# Patient Record
Sex: MALE | URBAN - METROPOLITAN AREA
[De-identification: names, ages, dates, MRNs, and addresses within clinical notes are randomized per-mention and may not be internally consistent; named-entity substitution may affect disease eponyms.]

---

## 2017-09-25 ENCOUNTER — NEW PATIENT (OUTPATIENT)
Dept: URBAN - METROPOLITAN AREA CLINIC 44 | Facility: CLINIC | Age: 57
End: 2017-09-25

## 2017-09-25 DIAGNOSIS — H33.321: ICD-10-CM

## 2017-09-25 DIAGNOSIS — H43.811: ICD-10-CM

## 2017-09-25 PROCEDURE — 92225 OPHTHALMOSCOPY (INITIAL): CPT

## 2017-09-25 PROCEDURE — 99244 OFF/OP CNSLTJ NEW/EST MOD 40: CPT

## 2017-09-25 ASSESSMENT — TONOMETRY
OS_IOP_MMHG: 11
OD_IOP_MMHG: 8

## 2017-09-25 ASSESSMENT — VISUAL ACUITY
OS_PH: 20/20-2
OD_SC: 20/30-1
OS_SC: 20/30+2
OD_PH: 20/25-2

## 2017-09-26 ENCOUNTER — PROCEDURE ONLY (OUTPATIENT)
Dept: URBAN - METROPOLITAN AREA CLINIC 11 | Facility: CLINIC | Age: 57
End: 2017-09-26

## 2017-09-26 DIAGNOSIS — H33.321: ICD-10-CM

## 2017-09-26 PROCEDURE — 67145 PROPH RTA DTCHMNT PC: CPT

## 2017-09-26 ASSESSMENT — TONOMETRY: OD_IOP_MMHG: 17

## 2017-09-26 ASSESSMENT — VISUAL ACUITY
OD_PH: 20/20-2
OD_SC: 20/30+2

## 2017-10-09 ENCOUNTER — POST-OP CHECK (OUTPATIENT)
Dept: URBAN - METROPOLITAN AREA CLINIC 44 | Facility: CLINIC | Age: 57
End: 2017-10-09

## 2017-10-09 DIAGNOSIS — H43.811: ICD-10-CM

## 2017-10-09 DIAGNOSIS — H33.301: ICD-10-CM

## 2017-10-09 PROCEDURE — 99024 POSTOP FOLLOW-UP VISIT: CPT

## 2017-10-09 ASSESSMENT — TONOMETRY: OD_IOP_MMHG: 18

## 2017-10-09 ASSESSMENT — VISUAL ACUITY: OD_SC: 20/20-1

## 2017-11-20 ENCOUNTER — POST-OP CHECK (OUTPATIENT)
Dept: URBAN - METROPOLITAN AREA CLINIC 44 | Facility: CLINIC | Age: 57
End: 2017-11-20

## 2017-11-20 DIAGNOSIS — H43.811: ICD-10-CM

## 2017-11-20 DIAGNOSIS — H33.301: ICD-10-CM

## 2017-11-20 PROCEDURE — 99024 POSTOP FOLLOW-UP VISIT: CPT

## 2017-11-20 ASSESSMENT — TONOMETRY: OD_IOP_MMHG: 14

## 2017-11-20 ASSESSMENT — VISUAL ACUITY: OD_SC: 20/25+1

## 2018-01-13 ENCOUNTER — UNSCHEDULED FOLLOW UP (OUTPATIENT)
Dept: URBAN - METROPOLITAN AREA CLINIC 11 | Facility: CLINIC | Age: 58
End: 2018-01-13

## 2018-01-13 DIAGNOSIS — H43.813: ICD-10-CM

## 2018-01-13 DIAGNOSIS — H33.301: ICD-10-CM

## 2018-01-13 PROCEDURE — 92014 COMPRE OPH EXAM EST PT 1/>: CPT

## 2018-01-13 PROCEDURE — 92226 OPHTHALMOSCOPY (SUB): CPT

## 2018-01-13 ASSESSMENT — TONOMETRY: OS_IOP_MMHG: 15

## 2018-02-12 ENCOUNTER — UNSCHEDULED FOLLOW UP (OUTPATIENT)
Dept: URBAN - METROPOLITAN AREA CLINIC 44 | Facility: CLINIC | Age: 58
End: 2018-02-12

## 2018-02-12 DIAGNOSIS — H33.312: ICD-10-CM

## 2018-02-12 DIAGNOSIS — H43.813: ICD-10-CM

## 2018-02-12 DIAGNOSIS — H33.301: ICD-10-CM

## 2018-02-12 PROCEDURE — 67145 PROPH RTA DTCHMNT PC: CPT

## 2018-02-12 PROCEDURE — 92012 INTRM OPH EXAM EST PATIENT: CPT | Mod: 57

## 2018-02-12 PROCEDURE — 92226 OPHTHALMOSCOPY (SUB): CPT

## 2018-02-12 ASSESSMENT — TONOMETRY
OS_IOP_MMHG: 14
OD_IOP_MMHG: 14

## 2018-02-12 ASSESSMENT — VISUAL ACUITY
OD_SC: 20/30+1
OS_SC: 20/30+2

## 2018-02-26 ENCOUNTER — POST-OP CHECK (OUTPATIENT)
Dept: URBAN - METROPOLITAN AREA CLINIC 44 | Facility: CLINIC | Age: 58
End: 2018-02-26

## 2018-02-26 DIAGNOSIS — H33.301: ICD-10-CM

## 2018-02-26 DIAGNOSIS — H43.813: ICD-10-CM

## 2018-02-26 DIAGNOSIS — H33.312: ICD-10-CM

## 2018-02-26 PROCEDURE — 99024 POSTOP FOLLOW-UP VISIT: CPT

## 2018-02-26 ASSESSMENT — VISUAL ACUITY: OS_SC: 20/25+2

## 2018-02-26 ASSESSMENT — TONOMETRY: OS_IOP_MMHG: 11

## 2018-04-30 ENCOUNTER — POST-OP CHECK (OUTPATIENT)
Dept: URBAN - METROPOLITAN AREA CLINIC 44 | Facility: CLINIC | Age: 58
End: 2018-04-30

## 2018-04-30 DIAGNOSIS — H33.301: ICD-10-CM

## 2018-04-30 DIAGNOSIS — H33.312: ICD-10-CM

## 2018-04-30 DIAGNOSIS — H43.813: ICD-10-CM

## 2018-04-30 PROCEDURE — 92226 OPHTHALMOSCOPY (SUB): CPT

## 2018-04-30 PROCEDURE — 92012 INTRM OPH EXAM EST PATIENT: CPT

## 2018-04-30 ASSESSMENT — TONOMETRY
OD_IOP_MMHG: 18
OS_IOP_MMHG: 13

## 2018-04-30 ASSESSMENT — VISUAL ACUITY
OS_SC: 20/20
OD_SC: 20/30+1

## 2018-07-23 ENCOUNTER — FOLLOW UP (OUTPATIENT)
Dept: URBAN - METROPOLITAN AREA CLINIC 44 | Facility: CLINIC | Age: 58
End: 2018-07-23

## 2018-07-23 DIAGNOSIS — H43.813: ICD-10-CM

## 2018-07-23 DIAGNOSIS — H33.312: ICD-10-CM

## 2018-07-23 DIAGNOSIS — H33.301: ICD-10-CM

## 2018-07-23 PROCEDURE — 92134 CPTRZ OPH DX IMG PST SGM RTA: CPT

## 2018-07-23 PROCEDURE — 92226 OPHTHALMOSCOPY (SUB): CPT

## 2018-07-23 PROCEDURE — 92014 COMPRE OPH EXAM EST PT 1/>: CPT

## 2018-07-23 ASSESSMENT — VISUAL ACUITY
OD_SC: 20/20
OS_SC: 20/20

## 2018-07-23 ASSESSMENT — TONOMETRY
OD_IOP_MMHG: 14
OS_IOP_MMHG: 16

## 2020-03-23 ENCOUNTER — FOLLOW UP (OUTPATIENT)
Dept: URBAN - METROPOLITAN AREA CLINIC 44 | Facility: CLINIC | Age: 60
End: 2020-03-23

## 2020-03-23 DIAGNOSIS — H43.813: ICD-10-CM

## 2020-03-23 DIAGNOSIS — H35.373: ICD-10-CM

## 2020-03-23 DIAGNOSIS — H33.301: ICD-10-CM

## 2020-03-23 DIAGNOSIS — H33.312: ICD-10-CM

## 2020-03-23 PROCEDURE — 92014 COMPRE OPH EXAM EST PT 1/>: CPT

## 2020-03-23 PROCEDURE — 92201 OPSCPY EXTND RTA DRAW UNI/BI: CPT

## 2020-03-23 PROCEDURE — 92134 CPTRZ OPH DX IMG PST SGM RTA: CPT

## 2020-03-23 ASSESSMENT — TONOMETRY
OD_IOP_MMHG: 15
OS_IOP_MMHG: 14

## 2020-03-23 ASSESSMENT — VISUAL ACUITY
OD_SC: 20/40
OS_SC: 20/30

## 2021-06-21 ENCOUNTER — FOLLOW UP (OUTPATIENT)
Dept: URBAN - METROPOLITAN AREA CLINIC 44 | Facility: CLINIC | Age: 61
End: 2021-06-21

## 2021-06-21 DIAGNOSIS — H25.13: ICD-10-CM

## 2021-06-21 DIAGNOSIS — H43.813: ICD-10-CM

## 2021-06-21 DIAGNOSIS — H33.301: ICD-10-CM

## 2021-06-21 DIAGNOSIS — H35.373: ICD-10-CM

## 2021-06-21 DIAGNOSIS — H33.312: ICD-10-CM

## 2021-06-21 PROCEDURE — 92014 COMPRE OPH EXAM EST PT 1/>: CPT

## 2021-06-21 PROCEDURE — 92202 OPSCPY EXTND ON/MAC DRAW: CPT

## 2021-06-21 PROCEDURE — 92134 CPTRZ OPH DX IMG PST SGM RTA: CPT

## 2021-06-21 ASSESSMENT — VISUAL ACUITY
OS_SC: 20/25-1
OD_SC: 20/200

## 2021-06-21 ASSESSMENT — TONOMETRY
OD_IOP_MMHG: 11
OS_IOP_MMHG: 11

## 2021-07-22 ENCOUNTER — UNSCHEDULED FOLLOW UP (OUTPATIENT)
Dept: URBAN - METROPOLITAN AREA CLINIC 11 | Facility: CLINIC | Age: 61
End: 2021-07-22

## 2021-07-22 DIAGNOSIS — H33.301: ICD-10-CM

## 2021-07-22 DIAGNOSIS — H25.13: ICD-10-CM

## 2021-07-22 DIAGNOSIS — H43.813: ICD-10-CM

## 2021-07-22 DIAGNOSIS — H35.373: ICD-10-CM

## 2021-07-22 DIAGNOSIS — H33.312: ICD-10-CM

## 2021-07-22 DIAGNOSIS — H20.21: ICD-10-CM

## 2021-07-22 DIAGNOSIS — H59.021: ICD-10-CM

## 2021-07-22 PROCEDURE — 92014 COMPRE OPH EXAM EST PT 1/>: CPT

## 2021-07-22 PROCEDURE — 92134 CPTRZ OPH DX IMG PST SGM RTA: CPT

## 2021-07-22 ASSESSMENT — VISUAL ACUITY: OD_SC: CF 6FT

## 2021-07-22 ASSESSMENT — TONOMETRY: OD_IOP_MMHG: 31

## 2021-07-28 ENCOUNTER — SURGERY/PROCEDURE (OUTPATIENT)
Dept: URBAN - METROPOLITAN AREA HOSPITAL 7 | Facility: HOSPITAL | Age: 61
End: 2021-07-28

## 2021-07-28 DIAGNOSIS — H20.21: ICD-10-CM

## 2021-07-28 PROCEDURE — 66850 REMOVAL OF LENS MATERIAL: CPT

## 2021-07-28 PROCEDURE — 67036 REMOVAL OF INNER EYE FLUID: CPT

## 2021-07-29 ENCOUNTER — 1 DAY POST-OP (OUTPATIENT)
Dept: URBAN - METROPOLITAN AREA CLINIC 44 | Facility: CLINIC | Age: 61
End: 2021-07-29

## 2021-07-29 DIAGNOSIS — H43.813: ICD-10-CM

## 2021-07-29 DIAGNOSIS — H25.13: ICD-10-CM

## 2021-07-29 DIAGNOSIS — H33.301: ICD-10-CM

## 2021-07-29 DIAGNOSIS — H20.21: ICD-10-CM

## 2021-07-29 DIAGNOSIS — H35.373: ICD-10-CM

## 2021-07-29 DIAGNOSIS — H33.312: ICD-10-CM

## 2021-07-29 DIAGNOSIS — Z98.890: ICD-10-CM

## 2021-07-29 PROCEDURE — 99024 POSTOP FOLLOW-UP VISIT: CPT

## 2021-07-29 ASSESSMENT — VISUAL ACUITY: OD_SC: 20/200

## 2021-08-02 ENCOUNTER — POST-OP CHECK (OUTPATIENT)
Dept: URBAN - METROPOLITAN AREA CLINIC 44 | Facility: CLINIC | Age: 61
End: 2021-08-02

## 2021-08-02 DIAGNOSIS — Z98.890: ICD-10-CM

## 2021-08-02 DIAGNOSIS — H25.13: ICD-10-CM

## 2021-08-02 DIAGNOSIS — H43.813: ICD-10-CM

## 2021-08-02 DIAGNOSIS — H33.301: ICD-10-CM

## 2021-08-02 DIAGNOSIS — H20.21: ICD-10-CM

## 2021-08-02 DIAGNOSIS — H33.312: ICD-10-CM

## 2021-08-02 DIAGNOSIS — H35.373: ICD-10-CM

## 2021-08-02 PROCEDURE — 99024 POSTOP FOLLOW-UP VISIT: CPT

## 2021-08-02 ASSESSMENT — TONOMETRY: OD_IOP_MMHG: 4

## 2021-08-02 ASSESSMENT — VISUAL ACUITY: OD_SC: 20/200

## 2021-08-10 ENCOUNTER — POST-OP CHECK (OUTPATIENT)
Dept: URBAN - METROPOLITAN AREA CLINIC 44 | Facility: CLINIC | Age: 61
End: 2021-08-10

## 2021-08-10 DIAGNOSIS — H20.21: ICD-10-CM

## 2021-08-10 DIAGNOSIS — H33.312: ICD-10-CM

## 2021-08-10 DIAGNOSIS — H43.813: ICD-10-CM

## 2021-08-10 DIAGNOSIS — H33.301: ICD-10-CM

## 2021-08-10 DIAGNOSIS — Z98.890: ICD-10-CM

## 2021-08-10 DIAGNOSIS — H35.373: ICD-10-CM

## 2021-08-10 DIAGNOSIS — H25.13: ICD-10-CM

## 2021-08-10 PROCEDURE — 99024 POSTOP FOLLOW-UP VISIT: CPT

## 2021-08-10 ASSESSMENT — VISUAL ACUITY: OD_SC: 20/200

## 2021-08-10 ASSESSMENT — TONOMETRY: OD_IOP_MMHG: 12

## 2021-08-30 ENCOUNTER — POST-OP CHECK (OUTPATIENT)
Dept: URBAN - METROPOLITAN AREA CLINIC 44 | Facility: CLINIC | Age: 61
End: 2021-08-30

## 2021-08-30 DIAGNOSIS — H43.813: ICD-10-CM

## 2021-08-30 DIAGNOSIS — Z98.890: ICD-10-CM

## 2021-08-30 DIAGNOSIS — H33.301: ICD-10-CM

## 2021-08-30 DIAGNOSIS — H35.373: ICD-10-CM

## 2021-08-30 DIAGNOSIS — H25.13: ICD-10-CM

## 2021-08-30 DIAGNOSIS — H33.312: ICD-10-CM

## 2021-08-30 DIAGNOSIS — H20.21: ICD-10-CM

## 2021-08-30 PROCEDURE — 99024 POSTOP FOLLOW-UP VISIT: CPT

## 2021-08-30 ASSESSMENT — VISUAL ACUITY: OD_SC: 20/200

## 2021-08-30 ASSESSMENT — TONOMETRY: OD_IOP_MMHG: 12

## 2021-09-08 ENCOUNTER — SURGERY/PROCEDURE (OUTPATIENT)
Dept: URBAN - METROPOLITAN AREA HOSPITAL 7 | Facility: HOSPITAL | Age: 61
End: 2021-09-08

## 2021-09-08 DIAGNOSIS — H35.373: ICD-10-CM

## 2021-09-08 PROCEDURE — 67041 VIT FOR MACULAR PUCKER: CPT | Mod: 79,RT

## 2021-09-09 ENCOUNTER — 1 DAY POST-OP (OUTPATIENT)
Dept: URBAN - METROPOLITAN AREA CLINIC 11 | Facility: CLINIC | Age: 61
End: 2021-09-09

## 2021-09-09 DIAGNOSIS — H25.13: ICD-10-CM

## 2021-09-09 DIAGNOSIS — H33.312: ICD-10-CM

## 2021-09-09 DIAGNOSIS — Z98.890: ICD-10-CM

## 2021-09-09 DIAGNOSIS — H20.21: ICD-10-CM

## 2021-09-09 DIAGNOSIS — H43.813: ICD-10-CM

## 2021-09-09 DIAGNOSIS — H33.301: ICD-10-CM

## 2021-09-09 DIAGNOSIS — H35.373: ICD-10-CM

## 2021-09-09 PROCEDURE — 99024 POSTOP FOLLOW-UP VISIT: CPT

## 2021-09-09 ASSESSMENT — VISUAL ACUITY: OD_CC: 20/200

## 2021-09-13 ENCOUNTER — POST-OP CHECK (OUTPATIENT)
Dept: URBAN - METROPOLITAN AREA CLINIC 44 | Facility: CLINIC | Age: 61
End: 2021-09-13

## 2021-09-13 DIAGNOSIS — H33.301: ICD-10-CM

## 2021-09-13 DIAGNOSIS — H20.21: ICD-10-CM

## 2021-09-13 DIAGNOSIS — Z98.890: ICD-10-CM

## 2021-09-13 DIAGNOSIS — H25.13: ICD-10-CM

## 2021-09-13 DIAGNOSIS — H43.813: ICD-10-CM

## 2021-09-13 DIAGNOSIS — H35.373: ICD-10-CM

## 2021-09-13 DIAGNOSIS — H33.312: ICD-10-CM

## 2021-09-13 PROCEDURE — 99024 POSTOP FOLLOW-UP VISIT: CPT

## 2021-09-13 ASSESSMENT — VISUAL ACUITY: OD_CC: 20/200

## 2021-09-13 ASSESSMENT — TONOMETRY: OD_IOP_MMHG: 10

## 2021-10-04 ENCOUNTER — POST-OP CHECK (OUTPATIENT)
Dept: URBAN - METROPOLITAN AREA CLINIC 44 | Facility: CLINIC | Age: 61
End: 2021-10-04

## 2021-10-04 DIAGNOSIS — H33.312: ICD-10-CM

## 2021-10-04 DIAGNOSIS — H25.13: ICD-10-CM

## 2021-10-04 DIAGNOSIS — Z98.890: ICD-10-CM

## 2021-10-04 DIAGNOSIS — H33.301: ICD-10-CM

## 2021-10-04 DIAGNOSIS — H35.373: ICD-10-CM

## 2021-10-04 DIAGNOSIS — H20.21: ICD-10-CM

## 2021-10-04 DIAGNOSIS — H43.813: ICD-10-CM

## 2021-10-04 PROCEDURE — 99024 POSTOP FOLLOW-UP VISIT: CPT

## 2021-10-04 ASSESSMENT — VISUAL ACUITY: OD_CC: 20/60

## 2021-10-04 ASSESSMENT — TONOMETRY: OD_IOP_MMHG: 13

## 2021-11-01 ENCOUNTER — POST-OP CHECK (OUTPATIENT)
Dept: URBAN - METROPOLITAN AREA CLINIC 44 | Facility: CLINIC | Age: 61
End: 2021-11-01

## 2021-11-01 DIAGNOSIS — H35.373: ICD-10-CM

## 2021-11-01 PROCEDURE — 92134 CPTRZ OPH DX IMG PST SGM RTA: CPT

## 2021-11-01 ASSESSMENT — VISUAL ACUITY
OD_SC: 20/80-1
OD_PH: 20/60

## 2021-11-01 ASSESSMENT — TONOMETRY: OD_IOP_MMHG: 15

## 2021-12-13 ENCOUNTER — POST-OP CHECK (OUTPATIENT)
Dept: URBAN - METROPOLITAN AREA CLINIC 44 | Facility: CLINIC | Age: 61
End: 2021-12-13

## 2021-12-13 DIAGNOSIS — Z98.890: ICD-10-CM

## 2021-12-13 DIAGNOSIS — H25.12: ICD-10-CM

## 2021-12-13 DIAGNOSIS — H33.301: ICD-10-CM

## 2021-12-13 DIAGNOSIS — H43.812: ICD-10-CM

## 2021-12-13 DIAGNOSIS — H35.373: ICD-10-CM

## 2021-12-13 DIAGNOSIS — H33.312: ICD-10-CM

## 2021-12-13 DIAGNOSIS — H20.21: ICD-10-CM

## 2021-12-13 PROCEDURE — 92012 INTRM OPH EXAM EST PATIENT: CPT

## 2021-12-13 ASSESSMENT — VISUAL ACUITY
OD_SC: 20/70-1
OD_PH: 20/60

## 2021-12-13 ASSESSMENT — TONOMETRY: OD_IOP_MMHG: 13

## 2024-04-15 ENCOUNTER — UNSCHEDULED FOLLOW UP (OUTPATIENT)
Dept: URBAN - METROPOLITAN AREA CLINIC 44 | Facility: CLINIC | Age: 64
End: 2024-04-15

## 2024-04-15 DIAGNOSIS — H43.812: ICD-10-CM

## 2024-04-15 DIAGNOSIS — H25.12: ICD-10-CM

## 2024-04-15 DIAGNOSIS — H33.312: ICD-10-CM

## 2024-04-15 DIAGNOSIS — Z98.890: ICD-10-CM

## 2024-04-15 DIAGNOSIS — H20.21: ICD-10-CM

## 2024-04-15 DIAGNOSIS — H35.373: ICD-10-CM

## 2024-04-15 DIAGNOSIS — H33.301: ICD-10-CM

## 2024-04-15 PROCEDURE — 92014 COMPRE OPH EXAM EST PT 1/>: CPT

## 2024-04-15 PROCEDURE — 92134 CPTRZ OPH DX IMG PST SGM RTA: CPT

## 2024-04-15 PROCEDURE — 92202 OPSCPY EXTND ON/MAC DRAW: CPT

## 2024-04-15 ASSESSMENT — VISUAL ACUITY
OD_CC: 20/20
OS_CC: 20/25+1

## 2024-04-15 ASSESSMENT — TONOMETRY
OD_IOP_MMHG: 14
OS_IOP_MMHG: 18

## 2024-08-16 ENCOUNTER — PREP FOR PROCEDURE (OUTPATIENT)
Age: 64
End: 2024-08-16

## 2024-08-16 ENCOUNTER — TELEPHONE (OUTPATIENT)
Age: 64
End: 2024-08-16

## 2024-08-16 DIAGNOSIS — Z12.11 SCREENING FOR COLON CANCER: Primary | ICD-10-CM

## 2024-08-16 NOTE — TELEPHONE ENCOUNTER
Scheduled date of colonoscopy (as of today): 9/6   Physician performing colonoscopy: ABBE   Location of colonoscopy: [facility]   Bowel prep reviewed with patient: ADRIENNE/ALVIN   Instructions reviewed with patient by: [email or web address]  Clearances: NONE

## 2024-08-16 NOTE — TELEPHONE ENCOUNTER
08/16/24  Screened by: Linda Chawla    Referring Provider     Pre- Screening:     BMI 28.5   Has patient been referred for a routine screening Colonoscopy? yes  Is the patient between 45-75 years old? yes      Previous Colonoscopy yes   If yes:    Date:  yrs ago     Facility: [facility]     Reason:         Does the patient want to see a Gastroenterologist prior to their procedure OR are they having any GI symptoms? no    Has the patient been hospitalized or had abdominal surgery in the past 6 months? no    Does the patient use supplemental oxygen? no    Does the patient take Coumadin, Lovenox, Plavix, Elliquis, Xarelto, or other blood thinning medication? no    Has the patient had a stroke, cardiac event, or stent placed in the past year? no      If patient is between 45yrs - 49yrs, please advise patient that we will have to confirm benefits & coverage with their insurance company for a routine screening colonoscopy.

## 2024-08-23 ENCOUNTER — ANESTHESIA EVENT (OUTPATIENT)
Dept: ANESTHESIOLOGY | Facility: AMBULATORY SURGERY CENTER | Age: 64
End: 2024-08-23

## 2024-08-23 ENCOUNTER — TELEPHONE (OUTPATIENT)
Dept: GASTROENTEROLOGY | Facility: CLINIC | Age: 64
End: 2024-08-23

## 2024-08-23 ENCOUNTER — ANESTHESIA (OUTPATIENT)
Dept: ANESTHESIOLOGY | Facility: AMBULATORY SURGERY CENTER | Age: 64
End: 2024-08-23

## 2024-09-04 ENCOUNTER — TELEPHONE (OUTPATIENT)
Dept: GASTROENTEROLOGY | Facility: AMBULATORY SURGERY CENTER | Age: 64
End: 2024-09-04

## 2024-09-04 NOTE — TELEPHONE ENCOUNTER
Pt returning Ana's call to confirm colonoscopy and see if he has questions on his prep. Per pt he confirmed that he is coming and asked when will the prep info be sent to him. I informed pt that it was emailed to [email or web address] on 8/16/24. Per pt he went into his email while on the phone and confirmed he did get it. I also gave him list of foods to avoid starting immediately. Pt also aware of 2 pm arrival time.

## 2024-09-06 ENCOUNTER — HOSPITAL ENCOUNTER (OUTPATIENT)
Dept: GASTROENTEROLOGY | Facility: AMBULATORY SURGERY CENTER | Age: 64
Discharge: HOME | End: 2024-09-06
Payer: COMMERCIAL

## 2024-09-06 ENCOUNTER — ANESTHESIA (OUTPATIENT)
Dept: GASTROENTEROLOGY | Facility: AMBULATORY SURGERY CENTER | Age: 64
End: 2024-09-06

## 2024-09-06 ENCOUNTER — ANESTHESIA EVENT (OUTPATIENT)
Dept: GASTROENTEROLOGY | Facility: AMBULATORY SURGERY CENTER | Age: 64
End: 2024-09-06

## 2024-09-06 VITALS
OXYGEN SATURATION: 99 % | DIASTOLIC BLOOD PRESSURE: 70 MMHG | SYSTOLIC BLOOD PRESSURE: 124 MMHG | TEMPERATURE: 98 F | RESPIRATION RATE: 20 BRPM | HEART RATE: 58 BPM | WEIGHT: 240 LBS | BODY MASS INDEX: 28.34 KG/M2 | HEIGHT: 77 IN

## 2024-09-06 DIAGNOSIS — Z12.11 SCREENING FOR COLON CANCER: ICD-10-CM

## 2024-09-06 PROCEDURE — 45380 COLONOSCOPY AND BIOPSY: CPT | Performed by: INTERNAL MEDICINE

## 2024-09-06 PROCEDURE — 45385 COLONOSCOPY W/LESION REMOVAL: CPT | Performed by: INTERNAL MEDICINE

## 2024-09-06 PROCEDURE — 88305 TISSUE EXAM BY PATHOLOGIST: CPT | Performed by: PATHOLOGY

## 2024-09-06 RX ORDER — SODIUM CHLORIDE, SODIUM LACTATE, POTASSIUM CHLORIDE, CALCIUM CHLORIDE 600; 310; 30; 20 MG/100ML; MG/100ML; MG/100ML; MG/100ML
50 INJECTION, SOLUTION INTRAVENOUS CONTINUOUS
Status: DISCONTINUED | OUTPATIENT
Start: 2024-09-06 | End: 2024-09-10 | Stop reason: HOSPADM

## 2024-09-06 RX ORDER — PROPOFOL 10 MG/ML
INJECTION, EMULSION INTRAVENOUS AS NEEDED
Status: DISCONTINUED | OUTPATIENT
Start: 2024-09-06 | End: 2024-09-06

## 2024-09-06 RX ADMIN — SODIUM CHLORIDE, SODIUM LACTATE, POTASSIUM CHLORIDE, CALCIUM CHLORIDE 50 ML/HR: 600; 310; 30; 20 INJECTION, SOLUTION INTRAVENOUS at 14:32

## 2024-09-06 RX ADMIN — PROPOFOL 120 MG: 10 INJECTION, EMULSION INTRAVENOUS at 14:49

## 2024-09-06 RX ADMIN — PROPOFOL 50 MG: 10 INJECTION, EMULSION INTRAVENOUS at 14:56

## 2024-09-06 RX ADMIN — PROPOFOL 50 MG: 10 INJECTION, EMULSION INTRAVENOUS at 15:09

## 2024-09-06 RX ADMIN — PROPOFOL 30 MG: 10 INJECTION, EMULSION INTRAVENOUS at 15:03

## 2024-09-06 RX ADMIN — PROPOFOL 50 MG: 10 INJECTION, EMULSION INTRAVENOUS at 14:52

## 2024-09-06 NOTE — ANESTHESIA PREPROCEDURE EVALUATION
Procedure:  COLONOSCOPY    Relevant Problems   No relevant active problems        Physical Exam    Airway    Mallampati score: II  TM Distance: >3 FB  Neck ROM: full     Dental   No notable dental hx     Cardiovascular      Pulmonary      Other Findings        Anesthesia Plan  ASA Score- 1     Anesthesia Type- IV sedation with anesthesia with ASA Monitors.         Additional Monitors:     Airway Plan:            Plan Factors-    Chart reviewed.    Patient summary reviewed.    Patient is not a current smoker.              Induction- intravenous.    Postoperative Plan-         Informed Consent- Anesthetic plan and risks discussed with patient.  I personally reviewed this patient with the CRNA. Discussed and agreed on the Anesthesia Plan with the CRNA..

## 2024-09-06 NOTE — H&P
"History and Physical -  [facility]  Terry Chau 64 y.o. male MRN: [id number]    HPI: Terry Chau is a 64 y.o. year old male who presents for colonoscopy secondary to personal history of polyps    REVIEW OF SYSTEMS: Per the HPI, and otherwise unremarkable.    Historical Information   Past Medical History:   Diagnosis Date    Patient denies medical problems      Past Surgical History:   Procedure Laterality Date    NO PAST SURGERIES       Social History   Social History     Substance and Sexual Activity   Alcohol Use Yes    Comment: social     Social History     Substance and Sexual Activity   Drug Use Never     Social History     Tobacco Use   Smoking Status Never   Smokeless Tobacco Never     History reviewed. No pertinent family history.    Meds/Allergies     No current outpatient medications on file.    Current Facility-Administered Medications:     lactated ringers infusion, 50 mL/hr, Intravenous, Continuous, Continue from Pre-op at 09/06/24 1444    No Known Allergies    Objective     /79   Pulse 61   Temp 98 °F (36.7 °C) (Temporal)   Resp 17   Ht 6' 5\" (1.956 m)   Wt 109 kg (240 lb)   SpO2 99%   BMI 28.46 kg/m²     PHYSICAL EXAM    Gen: NAD AAOx3  Head: Normocephalic, Atraumatic  CV: S1S2 RRR no m/r/g  CHEST: Clear b/l no c/r/w  ABD: soft, +BS NT/ND  EXT: no edema    ASSESSMENT/PLAN:  This is a 64 y.o. year old male here for colonoscopy secondary personal history of polyps, and he is stable and optimized for his procedure.        "

## 2024-09-09 PROCEDURE — 88305 TISSUE EXAM BY PATHOLOGIST: CPT | Performed by: PATHOLOGY

## (undated) RX ORDER — KETOROLAC TROMETHAMINE 5 MG/ML: 1 SOLUTION OPHTHALMIC

## (undated) RX ORDER — MOXIFLOXACIN HYDROCHLORIDE 5 MG/ML: 1 SOLUTION/ DROPS OPHTHALMIC

## (undated) RX ORDER — PREDNISOLONE ACETATE 10 MG/ML: 1 SUSPENSION/ DROPS OPHTHALMIC